# Patient Record
Sex: MALE | Race: WHITE | Employment: FULL TIME | URBAN - METROPOLITAN AREA
[De-identification: names, ages, dates, MRNs, and addresses within clinical notes are randomized per-mention and may not be internally consistent; named-entity substitution may affect disease eponyms.]

---

## 2018-01-31 ENCOUNTER — APPOINTMENT (OUTPATIENT)
Dept: LAB | Facility: REFERENCE LAB | Age: 33
End: 2018-01-31
Attending: OBSTETRICS & GYNECOLOGY
Payer: COMMERCIAL

## 2018-01-31 DIAGNOSIS — Z31.440 ENCOUNTER OF MALE FOR TESTING FOR GENETIC DISEASE CARRIER STATUS FOR PROCREATIVE MANAGEMENT: Primary | ICD-10-CM

## 2018-02-21 ENCOUNTER — APPOINTMENT (OUTPATIENT)
Dept: LAB | Facility: REFERENCE LAB | Age: 33
End: 2018-02-21
Attending: OBSTETRICS & GYNECOLOGY
Payer: COMMERCIAL

## 2018-02-21 DIAGNOSIS — Z13.71 TESTING OF MALE FOR GENETIC DISEASE CARRIER STATUS: ICD-10-CM

## 2018-02-21 DIAGNOSIS — Z13.71 TESTING OF MALE FOR GENETIC DISEASE CARRIER STATUS: Primary | ICD-10-CM

## 2018-02-21 LAB — RH BLOOD TYPE: POSITIVE

## 2018-02-21 PROCEDURE — 86901 BLOOD TYPING SEROLOGIC RH(D): CPT | Performed by: OBSTETRICS & GYNECOLOGY

## 2018-02-21 PROCEDURE — 36415 COLL VENOUS BLD VENIPUNCTURE: CPT | Performed by: OBSTETRICS & GYNECOLOGY

## 2018-02-21 PROCEDURE — 86900 BLOOD TYPING SEROLOGIC ABO: CPT | Performed by: OBSTETRICS & GYNECOLOGY

## 2018-02-22 ENCOUNTER — TELEPHONE (OUTPATIENT)
Dept: OBGYN CLINIC | Facility: CLINIC | Age: 33
End: 2018-02-22

## 2023-11-02 ENCOUNTER — OFFICE VISIT (OUTPATIENT)
Facility: CLINIC | Age: 38
End: 2023-11-02
Payer: COMMERCIAL

## 2023-11-02 ENCOUNTER — LAB ENCOUNTER (OUTPATIENT)
Dept: LAB | Facility: REFERENCE LAB | Age: 38
End: 2023-11-02
Attending: FAMILY MEDICINE
Payer: COMMERCIAL

## 2023-11-02 VITALS
SYSTOLIC BLOOD PRESSURE: 104 MMHG | HEIGHT: 69.5 IN | WEIGHT: 182 LBS | HEART RATE: 48 BPM | DIASTOLIC BLOOD PRESSURE: 72 MMHG | OXYGEN SATURATION: 98 % | BODY MASS INDEX: 26.35 KG/M2

## 2023-11-02 DIAGNOSIS — Z00.00 PHYSICAL EXAM, ANNUAL: ICD-10-CM

## 2023-11-02 DIAGNOSIS — Z00.00 PHYSICAL EXAM, ANNUAL: Primary | ICD-10-CM

## 2023-11-02 LAB
ALBUMIN SERPL-MCNC: 5 G/DL (ref 3.2–4.8)
ALBUMIN/GLOB SERPL: 1.9 {RATIO} (ref 1–2)
ALP LIVER SERPL-CCNC: 66 U/L
ALT SERPL-CCNC: 16 U/L
ANION GAP SERPL CALC-SCNC: 12 MMOL/L (ref 0–18)
AST SERPL-CCNC: 23 U/L (ref ?–34)
BASOPHILS # BLD AUTO: 0.09 X10(3) UL (ref 0–0.2)
BASOPHILS NFR BLD AUTO: 1.6 %
BILIRUB SERPL-MCNC: 1.1 MG/DL (ref 0.3–1.2)
BUN BLD-MCNC: 16 MG/DL (ref 9–23)
BUN/CREAT SERPL: 15.7 (ref 10–20)
CALCIUM BLD-MCNC: 10.1 MG/DL (ref 8.7–10.4)
CHLORIDE SERPL-SCNC: 102 MMOL/L (ref 98–112)
CHOLEST SERPL-MCNC: 179 MG/DL (ref ?–200)
CO2 SERPL-SCNC: 23 MMOL/L (ref 21–32)
CREAT BLD-MCNC: 1.02 MG/DL
DEPRECATED RDW RBC AUTO: 40.8 FL (ref 35.1–46.3)
EGFRCR SERPLBLD CKD-EPI 2021: 98 ML/MIN/1.73M2 (ref 60–?)
EOSINOPHIL # BLD AUTO: 0.25 X10(3) UL (ref 0–0.7)
EOSINOPHIL NFR BLD AUTO: 4.4 %
ERYTHROCYTE [DISTWIDTH] IN BLOOD BY AUTOMATED COUNT: 14.1 % (ref 11–15)
EST. AVERAGE GLUCOSE BLD GHB EST-MCNC: 88 MG/DL (ref 68–126)
FASTING PATIENT LIPID ANSWER: NO
FASTING STATUS PATIENT QL REPORTED: NO
GLOBULIN PLAS-MCNC: 2.6 G/DL (ref 2.8–4.4)
GLUCOSE BLD-MCNC: 102 MG/DL (ref 70–99)
HBA1C MFR BLD: 4.7 % (ref ?–5.7)
HCT VFR BLD AUTO: 45.4 %
HDLC SERPL-MCNC: 78 MG/DL (ref 40–59)
HGB BLD-MCNC: 15.7 G/DL
IMM GRANULOCYTES # BLD AUTO: 0.02 X10(3) UL (ref 0–1)
IMM GRANULOCYTES NFR BLD: 0.4 %
LDLC SERPL CALC-MCNC: 90 MG/DL (ref ?–100)
LYMPHOCYTES # BLD AUTO: 1.86 X10(3) UL (ref 1–4)
LYMPHOCYTES NFR BLD AUTO: 33 %
MCH RBC QN AUTO: 27.9 PG (ref 26–34)
MCHC RBC AUTO-ENTMCNC: 34.6 G/DL (ref 31–37)
MCV RBC AUTO: 80.8 FL
MONOCYTES # BLD AUTO: 0.6 X10(3) UL (ref 0.1–1)
MONOCYTES NFR BLD AUTO: 10.7 %
NEUTROPHILS # BLD AUTO: 2.81 X10 (3) UL (ref 1.5–7.7)
NEUTROPHILS # BLD AUTO: 2.81 X10(3) UL (ref 1.5–7.7)
NEUTROPHILS NFR BLD AUTO: 49.9 %
NONHDLC SERPL-MCNC: 101 MG/DL (ref ?–130)
OSMOLALITY SERPL CALC.SUM OF ELEC: 285 MOSM/KG (ref 275–295)
PLATELET # BLD AUTO: 211 10(3)UL (ref 150–450)
POTASSIUM SERPL-SCNC: 3.7 MMOL/L (ref 3.5–5.1)
PROT SERPL-MCNC: 7.6 G/DL (ref 5.7–8.2)
RBC # BLD AUTO: 5.62 X10(6)UL
SODIUM SERPL-SCNC: 137 MMOL/L (ref 136–145)
TRIGL SERPL-MCNC: 57 MG/DL (ref 30–149)
VLDLC SERPL CALC-MCNC: 9 MG/DL (ref 0–30)
WBC # BLD AUTO: 5.6 X10(3) UL (ref 4–11)

## 2023-11-02 PROCEDURE — 3008F BODY MASS INDEX DOCD: CPT | Performed by: FAMILY MEDICINE

## 2023-11-02 PROCEDURE — 80053 COMPREHEN METABOLIC PANEL: CPT | Performed by: FAMILY MEDICINE

## 2023-11-02 PROCEDURE — 99385 PREV VISIT NEW AGE 18-39: CPT | Performed by: FAMILY MEDICINE

## 2023-11-02 PROCEDURE — 83036 HEMOGLOBIN GLYCOSYLATED A1C: CPT | Performed by: FAMILY MEDICINE

## 2023-11-02 PROCEDURE — 85025 COMPLETE CBC W/AUTO DIFF WBC: CPT | Performed by: FAMILY MEDICINE

## 2023-11-02 PROCEDURE — 3074F SYST BP LT 130 MM HG: CPT | Performed by: FAMILY MEDICINE

## 2023-11-02 PROCEDURE — 3078F DIAST BP <80 MM HG: CPT | Performed by: FAMILY MEDICINE

## 2023-11-02 PROCEDURE — 80061 LIPID PANEL: CPT | Performed by: FAMILY MEDICINE

## 2024-11-08 ENCOUNTER — OFFICE VISIT (OUTPATIENT)
Facility: CLINIC | Age: 39
End: 2024-11-08
Payer: COMMERCIAL

## 2024-11-08 VITALS
SYSTOLIC BLOOD PRESSURE: 118 MMHG | DIASTOLIC BLOOD PRESSURE: 62 MMHG | HEIGHT: 69.5 IN | WEIGHT: 178 LBS | HEART RATE: 57 BPM | BODY MASS INDEX: 25.77 KG/M2 | OXYGEN SATURATION: 98 %

## 2024-11-08 DIAGNOSIS — M79.671 RIGHT FOOT PAIN: ICD-10-CM

## 2024-11-08 DIAGNOSIS — Z00.00 PHYSICAL EXAM, ANNUAL: Primary | ICD-10-CM

## 2024-11-08 DIAGNOSIS — R22.1 NECK NODULE: ICD-10-CM

## 2024-11-08 NOTE — PROGRESS NOTES
Yasir Hickman is a 39 year old male who presents for a complete physical exam.   HPI:     Doing well overall.     Has chronic right foot pain at 1st MTP. Waxes and weans. No prior imaging or evaluation.     Has skin nodule on right neck. Chronic. Increasing in size. Sees derm and was told likely benign.     Concerns: Above  Last colonoscopy:  Never  Last PSA: Never  Diet/exercise: Active. Runs at least 2x/wk. Rides pel[employer]unk[employer]. Diet is balanced.   Substance abuse: None  Vaccines- Tdap: 2016, Flu: completed  , Covid: to consider booster    REVIEW OF SYSTEMS:   GENERAL: feels well otherwise. See HPI  SKIN: denies any unusual skin lesions  EYES:denies vision change  HEENT: no hearing changes, negative  LUNGS: denies shortness of breath with exertion  CARDIOVASCULAR: denies chest pain on exertion  GI: denies abdominal pain, constipation or diarrhea. No hematochezia or melena  : denies nocturia or changes in stream  NEURO: denies headaches  PSYCHE: denies depression or anxiety    No current outpatient medications on file.      History reviewed. No pertinent past medical history.   History reviewed. No pertinent surgical history.   Family History   Problem Relation Age of Onset    Melanoma Father     Cancer Brother         Hodgkins Lymphoma at age 22 (in remission since 2006)    Cancer Maternal Grandmother         Breast cancer multiple times    Cancer Maternal Grandfather          from Malignant Melanoma      Social History:  Social History     Socioeconomic History    Marital status:    Tobacco Use    Smoking status: Never   Substance and Sexual Activity    Alcohol use: Yes     Alcohol/week: 12.0 standard drinks of alcohol     Types: 12 Cans of beer per week    Drug use: Never   Other Topics Concern    Caffeine Concern No    Exercise Yes     Comment: No concern. I exercise 5 days a week (jogging, biking)    Seat Belt No    Special Diet No    Stress Concern No    Weight Concern No           EXAM:     Wt  Readings from Last 6 Encounters:   11/08/24 178 lb (80.7 kg)   11/02/23 182 lb (82.6 kg)     Body mass index is 25.91 kg/m².    /62   Pulse 57   Ht 5' 9.5\" (1.765 m)   Wt 178 lb (80.7 kg)   SpO2 98%   BMI 25.91 kg/m²      GENERAL: well developed, well nourished, in no apparent distress  SKIN: no rashes, no suspicious lesions. 5mm diameter oval firm well-demarcated nontender mobile nodule on right posterolateral neck.  HEENT: atraumatic, normocephalic, MMM, nose normal, Tms & EACs normal  EYES: PERRLA, EOMI, no conjunctival injection  NECK: supple, no adenopathy   LUNGS: CTA b/l, no w/r/r  CARDIO: RRR without murmur  GI: normoactive bowel sounds, NT/ND, no masses, no HSM  EXTREMITIES: no cyanosis, clubbing or edema  NEURO: Alert & oriented, motor and sensory are grossly intact    Cholesterol, Total (mg/dL)   Date Value   11/02/2023 179     HDL Cholesterol (mg/dL)   Date Value   11/02/2023 78 (H)     LDL Cholesterol (mg/dL)   Date Value   11/02/2023 90     AST (U/L)   Date Value   11/02/2023 23     ALT (U/L)   Date Value   11/02/2023 16      ASSESSMENT AND PLAN:   Yasir Hickman is a 39 year old male who presents for a complete physical exam.    Encounter Diagnoses   Name Primary?    Physical exam, annual Yes    Neck nodule     Right foot pain      No orders of the defined types were placed in this encounter.      -Skin nodule: Likely benign. May schedule with plastic surgery prn for removal.  -Right 1st MTP pain: Recommend podiatry evaluation if worsens.   -Annual labs normal last year, thus will defer repeating at this time.     Discussed with patient the following:  -Risks and benefits of screening for prostate cancer:  -Colon cancer screening   -Healthy diet including adequate intake of vegetables and fruits, appropriate portion sizes, minimizing highly concentrated carbohydrate foods  -Exercising 30 minutes a day most days of the week   -Importance of regular exercise and weight maintenance/loss    -Diabetes screening   -Cholesterol screening   -Recommendation for yearly influenza vaccine  -Need for Tdap once as an adult and Td booster every 10 years    Meds & Refills for this Visit:  Requested Prescriptions      No prescriptions requested or ordered in this encounter       Imaging & Consults:  PLASTIC SURGERY - INTERNAL  PODIATRY - INTERNAL    Return in 1 year for annual physical or sooner as needed.     Jair Wang,   11/08/24   10:00 AM

## 2025-02-10 ENCOUNTER — OFFICE VISIT (OUTPATIENT)
Dept: SURGERY | Facility: CLINIC | Age: 40
End: 2025-02-10
Payer: COMMERCIAL

## 2025-02-10 DIAGNOSIS — L72.0 INCLUSION CYST: Primary | ICD-10-CM

## 2025-02-10 PROCEDURE — 99203 OFFICE O/P NEW LOW 30 MIN: CPT | Performed by: PLASTIC SURGERY

## 2025-02-10 NOTE — H&P
Yasir Hickman is a 39 year old male that presents with   Chief Complaint   Patient presents with    Cyst     Neck,right   .    REFERRED BY:  Jair Wang    Pacemaker: No  Latex Allergy: no  Coumadin: No  Plavix: No  Other anticoagulants: No  Diet medication: No  Cardiac stents: No    HAND DOMINANCE:  Left    Profession: Marketing creative,[employer] work    RECONSTRUCTIVE HISTORY    SUN EXPOSURE   Current no   Past no   Sunburns yes   Tanning salons current no   Tanning salons past no     SKIN CANCER    Personal history of skin cancer: none      HPI:       39-year-old male with a right neck cyst    3 years duration  Increased in size    No pain, bleeding or infection          Review of Systems:   Constitutional: No change in appetite, chill/rigors, or fatigue  GI: No jaundice  Endocrine: No generalized weakness  Neurological: No aphasia, loss of consciousness, or seizures       Integumentary:     LESION 1:    Location  R neck    Onset:   3 years    Pain:   no    Itching:   no    Bleeding:  no    Infection:  no    Size change?  increased:    Color change?  No     Biopsy?:   No    R neck with cyst without s/s of infection,nontender to touch.        PMH:     MEDICAL  History reviewed. No pertinent past medical history.     SURGICAL  History reviewed. No pertinent surgical history.     ALLERIGIES  Allergies[1]     MEDICATIONS  No current outpatient medications on file.        SOCIAL HISTORY  Social History     Socioeconomic History    Marital status:    Tobacco Use    Smoking status: Never   Substance and Sexual Activity    Alcohol use: Yes     Alcohol/week: 12.0 standard drinks of alcohol     Types: 12 Cans of beer per week    Drug use: Never   Other Topics Concern    Caffeine Concern No    Exercise Yes     Comment: No concern. I exercise 5 days a week (jogging, biking)    Seat Belt No    Special Diet No    Stress Concern No    Weight Concern No        FAMILY HISTORY  Family History   Problem Relation Age of  Onset    Melanoma Father     Cancer Brother         Hodgkins Lymphoma at age 22 (in remission since )    Cancer Maternal Grandmother         Breast cancer multiple times    Cancer Maternal Grandfather          from Malignant Melanoma          PHYSICAL EXAM:     CONSTITUTIONAL: Overall appearance - Normal  HEENT: Normocephalic  EYES: Conjunctiva - Right: Normal, Left: Normal; EOMI  EARS: Inspection - Right: Normal, Left: Normal  NECK/THYROID: Inspection - Normal, Palpation - Normal, Thyroid gland - Normal, No adenopathy  RESPIRATORY: Inspection - Normal, Effort - Normal  CARDIOVASCULAR: Regular rhythm, No murmurs  ABDOMEN: Inspection - Normal, No abdominal tenderness  NEURO: Memory intact  PSYCH: Oriented to person, place, time, and situation, Appropriate mood and affect      Integument Physical Exam:       Right lateral neck 1 cm inclusion cyst, not infected      ASSESSMENT/PLAN:     IMPRESSION:    INCLUSION CYST        We had a long discussion.  I explained to the patient the nature of this lesion / these lesions, as well as treatment options.    EXCISION: This lesion should be excised.  This will result in a permanent scar, which will be longer than the size of the lesion.  Further excision may be necessary, if tumor is present microscopically on the pathology report.  This may result in a longer scar.  Recurrence may occur, which will require further surgery.         TREATMENT:    Questions were answered and the patient wishes to proceed with treatment.    Excision          2/10/2025  Carlos Browning MD        +++++++++++++++++++++++++++++++++++++++++++++++++    MEDICAL DECISION MAKING    PROBLEMS      MODERATE    (number / complexity)          Undiagnosed new problem with uncertain prognosis    DATA         STRAIGHTFORWARD    (amount / complexity)           MANAGEMENT RISK  LOW    (complications/ morbidity)       Minor surgery                  MDM LEVEL    LOW            [1] No Known Allergies

## (undated) NOTE — LETTER
02/10/25      Patient: Yasir Hickman  : 1985 Visit date: 2/10/2025    Dear Rasta,      I examined your patient in consultation today.    He has an inclusion cyst of the right neck.  I would recommend excision.    Thank you for your kind referral. If I may answer any questions, please feel free to contact me.     Sincerely,   Carlos Browning MD     CC: Jair Wang DO